# Patient Record
Sex: FEMALE | Race: WHITE
[De-identification: names, ages, dates, MRNs, and addresses within clinical notes are randomized per-mention and may not be internally consistent; named-entity substitution may affect disease eponyms.]

---

## 2021-07-15 ENCOUNTER — HOSPITAL ENCOUNTER (OUTPATIENT)
Dept: HOSPITAL 46 - DS | Age: 76
Discharge: HOME | End: 2021-07-15
Attending: SURGERY
Payer: MEDICARE

## 2021-07-15 VITALS — HEIGHT: 64 IN | BODY MASS INDEX: 29.59 KG/M2 | WEIGHT: 173.35 LBS

## 2021-07-15 DIAGNOSIS — J44.9: ICD-10-CM

## 2021-07-15 DIAGNOSIS — Z90.49: ICD-10-CM

## 2021-07-15 DIAGNOSIS — C50.112: Primary | ICD-10-CM

## 2021-07-15 DIAGNOSIS — E03.9: ICD-10-CM

## 2021-07-15 DIAGNOSIS — Z87.891: ICD-10-CM

## 2021-07-15 DIAGNOSIS — C77.3: ICD-10-CM

## 2021-07-15 DIAGNOSIS — Z79.84: ICD-10-CM

## 2021-07-15 DIAGNOSIS — E11.9: ICD-10-CM

## 2021-07-15 DIAGNOSIS — M19.90: ICD-10-CM

## 2021-07-15 PROCEDURE — 07B60ZX EXCISION OF LEFT AXILLARY LYMPHATIC, OPEN APPROACH, DIAGNOSTIC: ICD-10-PCS | Performed by: SURGERY

## 2021-07-15 PROCEDURE — A9541 TC99M SULFUR COLLOID: HCPCS

## 2021-07-15 PROCEDURE — 0HBU0ZZ EXCISION OF LEFT BREAST, OPEN APPROACH: ICD-10-PCS | Performed by: SURGERY

## 2021-07-15 NOTE — NUR
PT READY FOR DISCHARGE. PT UP WITH STAND BY ASSIST FROM INEZ ZACARIAS WHO TAKES
PT TO RESTROM. INEZ STATES PT "VOIDED A LARGE AMOUNT." VOID UNMEAUSRED.
THIS RN TO ROOM, VITAL SIGNS STABLE. DRESSING SHOWS SCANT PIN POINT SHADOWING,
OTHERWISE C/D/I. PT DENIES PAIN AND NAUSEA. PT DRESSES WITH 1 PERSON
ASSISTANCE FROM HER DAUGHTER. DISCHARGE INSTRUCTIONS REVIEWED WITH PT AND PTS
DAUGHTER. PT AND DAUGHTER VERBALIZE UNDERSTANDING OF INSTRUCTIONS,
MEDICATIONS, ACTIVITY RESTRICTIONS, AND FOLLOW UP APPOINTMENT. PT TRANSFERS
SELF TO WHEELCHAIR, NO ASSISTANCE NEEDED. PT WHEELED FROM DAY SURGERY TO MEET
SISTER AND DAUGHTER, NO ADDITIONAL REQUSTS OR CONERNS.

## 2021-07-15 NOTE — NUR
REPORT RECEIVED FROM RELL CEDILLO. THIS RN ASSUMING CARE OF PT. THIS RN TO ROOM TO
CHECK ON PT. PT RESTING IN BED WITH EYES CLOSED. PT AWAKENS TO VOICE AND
MOVEMENT IN THE ROOM. PT DENIES PAIN AND NAUSEA. PT ALSO CONTINUES TO DENY
NEED TO VOID. PT WEANED TO ROOM AIR. O2 SATURATIONS MAINTAINING ABOVE 94% ON
ROOM AIR. PT REPORTS "I THINK I'LL GET UP IN JUST A LITTLE BIT." PT DENIES
ADDITIONAL REQUESTS OR COMPLAINTS AT THIS TIME. CALL LIGHT WITHIN REACH. BED
RAILS UP. FAMILY AT BEDSIDE.

## 2021-07-15 NOTE — NUR
07/15/21 1400 Olivia Rees
9501-PATIENT ARRIVED TO PACU ON 6L MASK RR EVEN NONAROUSABLE. DRESSING
BENEATH LEFT BREAST AND UNDER LEFT ARMPIT CDI. IVF INFUSING. SR.

## 2021-07-19 NOTE — OR
[facility]
                                    [address], [place]  [address]
_________________________________________________________________________________________
                                                                 Signed   
 
 
DATE OF OPERATION:
07/15/2021
 
SURGEON:
Carlos Tai MD
 
PREOPERATIVE DIAGNOSIS:
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
 
POSTOPERATIVE DIAGNOSES:
1. 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
2. [place] lymph nodes 2/3 positive for metastatic disease, additional non-sentinel
lymph node negative for metastatic disease. 
 
PROCEDURES:
1. Injection of Methylene blue for sentinel lymph node identification.
2. Left deep axillary left node biopsy x3 (3 sentinel and additional non-sentinel).
3. Left partial mastectomy with additional deep margin excision including minimal
portion of pectoralis fascia and muscle. 
 
ANESTHESIA:
General, Dustin Pickens CRNA.
 
INDICTIONS:
This 76-year-old white woman is a patient of Dr. Silva Miller, radiologist in
[place], and formerly Dr. Lg Grider, in [place], [place].  She is noted to have
a palpable mass in the inferior aspect of the right breast in the breast crease, which
is quite obviously palpable.  The patient had undergone a mammogram 2 years ago in
[place], [place], was noted to have a mass in the inferior aspect, was recommended to
have additional images and palpable biopsy, but the patient declined.  The patient saw
Dr. Metz in recent times and the report was reviewed and again she was recommended to
undergo additional mammography and evaluation; a palpable mass was noted too.  Mass was
located in the central inferior aspect in the breast crease.  Ultrasound was performed
at the 6 o'clock position of the mass was found to be hypoechoic and lobulated,
measuring at least 39 mm in maximum dimension; it had internal blood flow consistent
with the neoplasm.  A mass had been palpated at the 12 o'clock position and 2 o'clock
position, either which showed any abnormality.  My clinical exam shows only the dominant
mass in the central inferior aspect in the breast crease itself.  A biopsy was performed
on June 24, 2020, by ultrasound guidance confirming infiltrating ductal carcinoma grade
1/3, without associated in situ component.  The patient was offered breast conserving
therapy to include lumpectomy, central lymph node biopsy, possible axillary dissection,
as well as radiation therapy or mastectomy with central lymph node biopsy and possible
 
    Electronically Signed By: CARLOS TAI MD  07/19/21 1829
_________________________________________________________________________________________
PATIENT NAME:     DARVIN DANIEL                 
MEDICAL RECORD #: [id number]            OPERATIVE REPORT              
          ACCT #: [id number]  
DATE OF BIRTH:   [birth year]            REPORT #: [id number]      
PHYSICIAN:        CARLOS TAI MD                 
PCP:              LG GRIDER MD         
REPORT IS CONFIDENTIAL AND NOT TO BE RELEASED WITHOUT AUTHORIZATION
 
 
                                  [facility]
                                    [address], [place]  [address]
_________________________________________________________________________________________
                                                                 Signed   
 
 
axillary dissection.  She prefers to have lumpectomy if possible.  The risks of bleeding
and infection,  cosmetic deformity and need for additional treatment, and need for
axillary dissection were reviewed with her.  Current guidelines according to
[facility]ABP-; if more than 2 sentinel lymph nodes are positive, then axillary dissection
was certainly be undertaken; and if 2 or less then it would not be undertaken at this
time.  She understands this variations of treatment.  She also understands a
chemotherapy will possibly be recommended if lymph node positive and definitely will be
recommended for radiation therapy.  Understand all of that and she wishes to proceed. 
 
FINDINGS:
Good uptake Methylene blue dye to sentinel lymph nodes was noted.  Additionally,
radionuclide was taken up as well.  There were 3 obvious sentinel lymph nodes identified
and one additional non-sentinel lymph node also identified and excised.  Frozen
pathology showed 2/3 sentinel lymph nodes to have metastatic disease and the
non-sentinel node was negative as was the third sentinel lymph node.  Remaining axilla
was negative and had actually no uptake of radionuclide or methylene blue dye.
Completion of axillary dissection was not undertaken. 
 
As regard to tumor itself that was firm and quite obvious, but located well in relation
to the breast in the central inferior aspect in the breast crease.  Wide excision was
skin and breast parenchyma was undertaken, the lesion did not clinically appeared to
penetrate through the pectoralis fascia and to the muscle; however, an additional deep
margin was taken given in the location of the tumor and to assure that there is negative
margin. 
 
DESCRIPTION OF PROCEDURE:
The patient was brought to the operating room, given a general anesthetic.  Preoperative
antibiotic Ancef was given, sequential compression stockings used, and heparin
subcutaneously administered and Radiology sweep for radionuclide injection for sentinel
lymph node identification. 
 
A 2 mL of Methylene blue dye was injected in the central inferior subepithelial area
near the areolar margin for additional localization of sentinel lymph nodes.  The arm
was extended and the left breast, upper axilla and arm, and chest wall prepared with a
Betadine bath solution.  Using a C-Trak gamma probe device, the axilla was interrogated
and did have an area of increased uptake.  A small transverse incision in the axilla was
made and the dissection was carried through the subcutaneous tissue.  Multiple
lymphatics were identified by Methylene blue dye and further dissection identified a
moderate sized lymph node and not far from it and noted the sentinel lymph node both
with uptake of radionuclide and dye.  This was dissected free and passed the central
lymph nodes #1 and #2.  An additional non-sentinel node was noted with a specimen as
well.  A general dissection more thoroughly undertaken showed additional lymphatic
 
    Electronically Signed By: CARLOS TAI MD  07/19/21 2437
_________________________________________________________________________________________
PATIENT NAME:     DARVIN DANIEL                 
MEDICAL RECORD #: [id number]            OPERATIVE REPORT              
          ACCT #: [id number]  
DATE OF BIRTH:   [birth year]            REPORT #: [id number]      
PHYSICIAN:        CARLOS TAI MD                 
PCP:              LG GRIDER MD         
REPORT IS CONFIDENTIAL AND NOT TO BE RELEASED WITHOUT AUTHORIZATION
 
 
                                  [facility]
                                    [address], [place]  [address]
_________________________________________________________________________________________
                                                                 Signed   
 
 
channels with Methylene blue dye and dissection was carried along the path of these
lymphatic channels identifying another blue sentinel lymph node, and it was identified
as sentinel lymph node #3.  These were sent for frozen pathology.  Additional palpation
and interrogation with a gamma probe showed no other candidates considered sentinel
lymph nodes.  Wound was then packed with some gauze and attention towards the primary
tumor. 
 
Tumor itself was in the central inferior aspect; it was not fixed to the chest wall.  An
elliptical incision was made incorporating general segment of skin directly over the
mass and located also in the inframammary crease.  Dissection to the dermis was
undertaken with electrocautery.  Flaps were developed inferiorly and superiorly,
maintaining the clinically negative margin between the tumor and the breast parenchyma.
The deepest portion of resection was undertaken to provide any clinically negative
margin.  Specimen was oriented with a short stitch superiorly and long stitch laterally.
 Examination of the operative site showed no particularly suspicious remaining areas.
Now at the last given location of the tumor, which was rather deep in relation to the
breast parenchyma and close in relation to the pectoralis fascia, resection of the base
of the wound was undertaken, which include portions of the pectoralis muscle in minimal
amount as well as the margin of the tumor.  This was oriented similarly as a superior
stitch, that was showed in a lateral stitch that was long.  Irrigation was undertaken in
sterile water.  There was no findings of concern.  Hemostasis was assured with
electrocautery.  The parenchyma of the breast was reapproximated with interrupted 2-0
Vicryl in layers.  At this point, the frozen pathology report was returned by Dr. Connors
and this showed sentinel lymph node #1 to have metastatic disease, considered
"microscopic" thin lymph node and sentinel lymph node #2 similarly positive.  The 3rd
sentinel lymph node was considered negative.  Mindful, there was additional non-sentinel
lymph node submitted, it was additionally evaluated by frozen pathology showing no sign
of metastatic disease.  Mindful, current guidelines guiding the positivity of the
sentinel lymph nodes as more than 2 positive supporting the concept of completion
axillary dissection, she was considered reasonable to avoid axillary dissection under
this circumstances.  Irrigation was undertaken the axilla was sterile water, was no
evidence of other palpable abnormalities and re-interrogation with the C-Trak probe,
showed no additional candidate that might be submitted as a sentinel node or even any
other node particularly.  There are no clinical suspicious nodes either.  The wound was
then closed with interrupted 2-0 Vicryl in deep layer and running subcuticular 3-0
Vicryl for the skin.  Steri-Strips were applied to the both sides as were Acticoat
dressings.  She was ultimately extubated and taken to recovery room in good condition
with no complication.  Sponge, needle, and instruments counts were reported as correct
x3. 
 
 
 
 
    Electronically Signed By: CARLOS TAI MD  07/19/21 8589
_________________________________________________________________________________________
PATIENT NAME:     DARVIN DANIEL                 
MEDICAL RECORD #: [id number]            OPERATIVE REPORT              
          ACCT #: [id number]  
DATE OF BIRTH:   [birth year]            REPORT #: [id number]      
PHYSICIAN:        CARLOS TAI MD                 
PCP:              LG GRIDER MD         
REPORT IS CONFIDENTIAL AND NOT TO BE RELEASED WITHOUT AUTHORIZATION
 
 
                                  [address]  [address]
_________________________________________________________________________________________
                                                                 Signed   
 
 
            ________________________________________
            MD CONSUELO Mckeon/YUNIER
Job #:  [id number]
DD:  07/15/2021 14:17:13
DT:  07/15/2021 22:12:36
 
cc:            MD Fred Henry, MD Silva Miller, MD Davi Saunders MD, PH.D.
 
 
Copies:  LG GRIDER MD,FRED MILLER,DAVI JERNIGAN MD
 
 
 
 
 
 
 
 
 
 
 
 
 
 
 
 
 
 
 
 
    Electronically Signed By: CARLOS TAI MD  07/19/21 1829
_________________________________________________________________________________________
PATIENT NAME:     DARVIN DANIEL                 
MEDICAL RECORD #: [id number]            OPERATIVE REPORT              
          ACCT #: [id number]  
DATE OF BIRTH:   [birth year]            REPORT #: [id number]      
PHYSICIAN:        CARLOS TAI MD                 
PCP:              LG GRIDER MD         
REPORT IS CONFIDENTIAL AND NOT TO BE RELEASED WITHOUT AUTHORIZATION

## 2021-08-24 ENCOUNTER — HOSPITAL ENCOUNTER (OUTPATIENT)
Dept: HOSPITAL 46 - DS | Age: 76
Discharge: HOME | End: 2021-08-24
Attending: SURGERY
Payer: MEDICARE

## 2021-08-24 VITALS — WEIGHT: 175.27 LBS | HEIGHT: 64 IN | BODY MASS INDEX: 29.92 KG/M2

## 2021-08-24 DIAGNOSIS — C50.912: Primary | ICD-10-CM

## 2021-08-24 DIAGNOSIS — Z86.73: ICD-10-CM

## 2021-08-24 DIAGNOSIS — Z90.49: ICD-10-CM

## 2021-08-24 DIAGNOSIS — N64.1: ICD-10-CM

## 2021-08-24 DIAGNOSIS — E11.9: ICD-10-CM

## 2021-08-24 DIAGNOSIS — Z79.84: ICD-10-CM

## 2021-08-24 DIAGNOSIS — C77.3: ICD-10-CM

## 2021-08-24 DIAGNOSIS — J45.909: ICD-10-CM

## 2021-08-24 PROCEDURE — 07B60ZX EXCISION OF LEFT AXILLARY LYMPHATIC, OPEN APPROACH, DIAGNOSTIC: ICD-10-PCS | Performed by: SURGERY

## 2021-08-24 NOTE — NUR
[id number]: PT RESTING IN BED AWAKE CONVERSING WITH FAMILY AT BEDSIDE. PT
TOLERATES JELLO AND WATER WITH NO NAUSEA. PT CONT TO DENY PAIN IN LEFT AXILLA.
PROVIDED COFFEE PER REQUEST. PT ASKS WHEN SHE CAN DC HOME AND ENCOURAGED TO
VOID PRIOR TO DC. CALL LIGHT WITHIN REACH, PT ENCOURAGED TO USE WITH URGE TO
VOID.

## 2021-08-24 NOTE — NUR
PT ARRIVES BACK TO DS RM 4 FROM PACU DROWSY. PT AROUSES TO VERBAL STIMULATION
AND ANSWERS COMMANDS APPROPRIATELY, BUT EASILY DOZES OFF WITH NO STIMULTION.
PT STATES, "BOY I AM SLEEPY." PT DAUGHTER AT BEDSIDE. DC CRITERIA EXPLAINED TO
PT, CALL LIGHT WITHIN REACH.

## 2021-08-24 NOTE — NUR
[id number]: PT USES CALL LIGHT TO NOTIFY THIS RN OF URGE TO VOID. MISHA DRAIN SAFETY
PINNED TO GOWN, PT SITS AT SIDE OF BED DENIES ANY DIZZINESS OR NAUSEA WITH
POSITION CHANGE. PT AMBULATES WITH STEADY GAIT TO BATHROOM, ABLE TO VOID
APPROX 100 MLS YELLOW URINE WITH NO PROBLEMS. PT BACK TO DS RM 4 TO GET
CHANGED, DAUGHTER IN ROOM AT BEDSIDE TO ASSIST.
1220: DC INSTRUCTIONS GIVEN VERBALLY AND WRITTEN. PT AND PT DAUGHTER EDUCATED
ABOUT MISHA DRAIN AND GIVEN DEMONSTRATION OF HOW TO DRAIN AND PROPERLY CARE FOR
WITH EDUCATION AND FLOWSHEET. PAIN MEDICATION PRESCRIPTION GIVEN TO PT,
ENCOURAGED TO TAKE TO PHARMACY TO FILL. PT DC FROM DS RM 4 VIA WC TO FAMILY
WAITING IN PERSONAL VEHICLE AT MAIN ENTRANCE OF HOSPITAL TO HOME.

## 2021-08-24 NOTE — NUR
08/24/21 0912 Laura Damon 0858 PT ARRIVED IN PACU NON RESPONSIVE TO NOXIOUS STIMULI WITH OPA
IN PLACE. BLOOD SUGAR 199. 0910 NO CHANGE IN STATUS.

## 2021-08-25 NOTE — OR
[facility]
                                    [address]
                                  [place], [place]  [address]
_________________________________________________________________________________________
                                                                 Signed   
 
 
DATE OF OPERATION:
08/24/2021
 
SURGEON:
Carlos Tai MD
 
PREOPERATIVE DIAGNOSES:
1. Left infiltrating ductal breast carcinoma, status post partial mastectomy with
sentinel lymph node biopsy x3. 
2. Metastatic disease to the sentinel lymph nodes x3.
 
POSTOPERATIVE DIAGNOSES:
1. Left infiltrating ductal breast carcinoma, status post partial mastectomy with
sentinel lymph node biopsy x3. 
2. Metastatic disease to the sentinel lymph nodes x3.
 
PROCEDURE:
Left axillary dissection.
 
ANESTHESIA:
General endotracheal; Giles Marino CRNA.
 
INDICATIONS:
This 76-year-old white woman is a patient of Dr. Torey Metz and underwent left
partial mastectomy with sentinel lymph node biopsy x3 on July 15, 2021.  At operation,
frozen pathology showed the first two sentinel lymph nodes to be positive for metastatic
disease, the 3rd one considered negative.  Upon final review, however, the sentinel
lymph node #3 was also positive.  On the basis of current guidelines, completion of
axillary dissection is recommended.  As regards to the primary tumor, it was 28 mm in
size and considered grade 2/3.  There was no DCIS component.  Excision margins were
negative.  She is admitted at this time to undergo completion of left axillary
dissection.  She understands the risks of bleeding, infection, nerve injury, and other
unforeseen complications. 
 
FINDINGS:
Scar tissue from prior dissection was noted.  Dissection was undertaken with axillary
tissue excised inferior to the left subclavian rather the left axillary vein lateral to
the long thoracic nerve and superficial to the thoracodorsal neurovascular bundle.
Generous fatty tissue presumably with lymph nodes was noted.  There was no sign of
obvious positive lymph nodes, though final pathology is pending of course.  Good
function of the nerves in question was demonstrated. 
 
 
    Electronically Signed By: CARLOS TAI MD  08/25/21 1317
_________________________________________________________________________________________
PATIENT NAME:     DARVIN DANIEL                 
MEDICAL RECORD #: [id number]            OPERATIVE REPORT              
          ACCT #: [id number]  
DATE OF BIRTH:   [birth year]            REPORT #: [id number]      
PHYSICIAN:        CARLOS TAI MD                 
PCP:              JERARDO GRIDER MD         
REPORT IS CONFIDENTIAL AND NOT TO BE RELEASED WITHOUT AUTHORIZATION
 
 
                                  [facility]
                                    [address]
                                  [place]  [address]
_________________________________________________________________________________________
                                                                 Signed   
 
 
DESCRIPTION OF PROCEDURE:
The patient was brought to the operating room, given a general endotracheal anesthetic.
Preoperative antibiotic Ancef was given.  Sequential compression device stockings were
used and heparin subcutaneously administered.  After satisfactory general endotracheal
anesthesia, the left breast and axilla were prepared with a chlorhexidine solution and
draped sterilely.  The previous small transverse incision in the axilla was incised
medially and laterally and dissection carried through the subcutaneous tissue with blunt
and electrocautery dissection.  Using sharp dissection, the axillary fat pad was
dissected free identifying ultimately the axillary vein.  Dissection inferior to this
and medial allowed for identification ultimately, the long thoracic and thoracodorsal
neurovascular bundles and the axillary fat and attendant lymph nodes were incorporated
in the resected specimen.  Clips were used for hemostasis throughout.  The long thoracic
and thoracodorsal neurovascular bundles were intact and unharmed.  The specimen was
passed for pathology.  Irrigation was undertaken.  The axilla was sterile water for its
tumor and lytic effect.  Additional clips and cautery used in areas suspect for possible
future bleeding.  Through a separate stab incision, a 7 mm flat Rodolfo drain was placed
and secured to the skin with a nylon suture.  The deep axillary soft tissue was
reapproximated with interrupted 2-0 Vicryl and skin closed with a running subcuticular
3-0 Vicryl, Steri-Strips were applied as was an Acticoat silver sponge dressing.  The
patient tolerated procedure well. 
 
BLOOD LOSS:
Less than 20 mL in aggregate.
 
 
 
            ________________________________________
            Carlos Tai MD 
 
 
/MONIQUEL
Job #:  [id number]
DD:  08/24/2021 09:11:09
DT:  08/24/2021 10:07:17
 
cc:            MD Tl Mcclain MD, PH.D. 
 
               Yassine Blake MD
 
 
 
    Electronically Signed By: CARLOS TAI MD  08/25/21 1317
_________________________________________________________________________________________
PATIENT NAME:     DARVIN DANIEL                 
MEDICAL RECORD #: [id number]            OPERATIVE REPORT              
          ACCT #: [id number]  
DATE OF BIRTH:   [birth year]            REPORT #: [id number]      
PHYSICIAN:        CARLOS TAI MD                 
PCP:              JERARDO GRIDER MD         
REPORT IS CONFIDENTIAL AND NOT TO BE RELEASED WITHOUT AUTHORIZATION
 
 
                                  [address]  [address]
_________________________________________________________________________________________
                                                                 Signed   
 
 
Copies:  TOREY METZ MD, JUNO QUACKENBUSH, ROBERT C MD
~
 
 
 
 
 
 
 
 
 
 
 
 
 
 
 
 
 
 
 
 
 
 
 
 
 
 
 
 
 
 
 
 
 
 
 
 
 
 
 
    Electronically Signed By: CARLOS TAI MD  08/25/21 1317
_________________________________________________________________________________________
PATIENT NAME:     FREDYDARVINSANJAY BAGLEY                 
MEDICAL RECORD #: [id number]            OPERATIVE REPORT              
          ACCT #: [id number]  
DATE OF BIRTH:   [birth year]            REPORT #: [id number]      
PHYSICIAN:        CARLOS TAI MD                 
PCP:              JERARDO GRIDER MD         
REPORT IS CONFIDENTIAL AND NOT TO BE RELEASED WITHOUT AUTHORIZATION

## 2021-08-26 NOTE — PATH
[facility]
                                    [address], [place]  [address]
_________________________________________________________________________________________
                                                                 Signed   
 
 
 
SPECIMEN(S): A LEFT AXILLARY CONTENTS
 
SPECIMEN SOURCE:
A. LEFT AXILLARY CONTENTS
 
CLINICAL HISTORY:
Infiltrating ductal carcinoma of left breast
 
FINAL PATHOLOGIC DIAGNOSIS:
Left axillary contents, dissection:
-  No evidence of malignancy in ten lymph nodes (0/10).
-  Fibroadipose tissue with fat necrosis.
NAL:cml:C2NR
 
MICROSCOPIC EXAMINATION:
Histologic sections of all submitted blocks are examined by light microscopy.  
These findings, together with the gross examination, support the pathologic 
diagnosis. 
 
GROSS DESCRIPTION:
The specimen, labeled "BA," and designated on the requisition "left axillary 
contents," is received in formalin and consists of one piece of, unoriented, 
ragged, yellow, fibrofatty, 11.7 x 0.2 x 3.4 
cm tissue containing 11 pink-tan to tan-white somewhat chalky, lymph node 
candidates from 0.3 up to 2.6 cm in greatest dimension. 
The lymph node candidate is submitted entirely as follows:
A1   5 lymph node candidates in toto
A2   2 lymph node candidates in toto
A3   one bisected lymph node candidate
A4   one bisected lymph node candidate
A5-A8     one cross-sectioned lymph node candidate
A9-A12    one cross-sectioned lymph node candidate
AI (under the direct supervision of a pathologist)
The Gross Description was prepared using a voice recognition system. The report 
was reviewed for accuracy; however, sound-alike word errors, addition and/or 
deletions may occur. If there is any 
question about this report, please contact Client Services.
 
PERFORMING LABORATORY:
The technical component was performed by [facility], [address] [place] [id number] (Medical Director: Rachel Varghese MD; CLIA# [id number]). 
 
                                                                                    
_________________________________________________________________________________________
PATIENT NAME:     DARVIN DANIEL                 
MEDICAL RECORD #: [id number]            PATHOLOGY                     
          ACCT #: [id number]       ACCESSION #: [id number]     
DATE OF BIRTH:   [birth year]            REPORT #: [id number]       
PHYSICIAN:        [facility]              
PCP:              JERARDO GRIDER MD         
REPORT IS CONFIDENTIAL AND NOT TO BE RELEASED WITHOUT AUTHORIZATION
 
 
                                  [facility]
                                    [address], [place]  [address]
_________________________________________________________________________________________
                                                                 Signed   
 
 
Professional interpretation was performed by 
[facility], [address] [address] (CLIA# [id number]). 
 
Diagnostician:  Rosaline Connors MD
Pathologist
Electronically Signed 08/26/2021
 
 
Copies:                                
~
 
 
 
 
 
 
 
 
 
 
 
 
 
 
 
 
 
 
 
 
 
 
 
 
 
 
 
 
 
 
 
 
                                                                                    
_________________________________________________________________________________________
PATIENT NAME:     DARVIN DANIEL                 
MEDICAL RECORD #: [id number]            PATHOLOGY                     
          ACCT #: [id number]       ACCESSION #: [id number]     
DATE OF BIRTH:   [birth year]            REPORT #: [id number]       
PHYSICIAN:        [facility]              
PCP:              JERARDO GRIDER MD         
REPORT IS CONFIDENTIAL AND NOT TO BE RELEASED WITHOUT AUTHORIZATION

## 2022-12-13 ENCOUNTER — HOSPITAL ENCOUNTER (EMERGENCY)
Dept: HOSPITAL 46 - ED | Age: 77
Discharge: HOME | End: 2022-12-13
Payer: MEDICARE

## 2022-12-13 VITALS — WEIGHT: 172 LBS | HEIGHT: 65 IN | BODY MASS INDEX: 28.66 KG/M2

## 2022-12-13 DIAGNOSIS — N39.0: ICD-10-CM

## 2022-12-13 DIAGNOSIS — Z20.822: ICD-10-CM

## 2022-12-13 DIAGNOSIS — E78.5: ICD-10-CM

## 2022-12-13 DIAGNOSIS — E11.9: ICD-10-CM

## 2022-12-13 DIAGNOSIS — Z79.84: ICD-10-CM

## 2022-12-13 DIAGNOSIS — Z79.899: ICD-10-CM

## 2022-12-13 DIAGNOSIS — J10.1: Primary | ICD-10-CM

## 2022-12-13 PROCEDURE — U0003 INFECTIOUS AGENT DETECTION BY NUCLEIC ACID (DNA OR RNA); SEVERE ACUTE RESPIRATORY SYNDROME CORONAVIRUS 2 (SARS-COV-2) (CORONAVIRUS DISEASE [COVID-19]), AMPLIFIED PROBE TECHNIQUE, MAKING USE OF HIGH THROUGHPUT TECHNOLOGIES AS DESCRIBED BY CMS-2020-01-R: HCPCS

## 2022-12-13 PROCEDURE — G0480 DRUG TEST DEF 1-7 CLASSES: HCPCS

## 2022-12-13 PROCEDURE — C9803 HOPD COVID-19 SPEC COLLECT: HCPCS

## 2022-12-13 NOTE — EKG
[facility]
                                    [address] [place]  [address]
_________________________________________________________________________________________
                                                                 Signed   
 
 
Sinus tachycardia with premature ventricular complexes or fusion complexes
Otherwise normal ECG
When compared with ECG of 12-JUL-2021 09:29,
fusion complexes are now present
premature ventricular complexes are now present
Confirmed by QIAN RUTLEDGE MD ([id number]) on 12/13/2022 10:42:49 PM
 
 
 
 
 
 
 
 
 
 
 
 
 
 
 
 
 
 
 
 
 
 
 
 
 
 
 
 
 
 
 
 
 
 
 
 
 
 
 
    Electronically Signed By: QIAN RUTLEDGE MD  12/13/22 2242
_________________________________________________________________________________________
PATIENT NAME:     DARVIN DANIEL                 
MEDICAL RECORD #: [id number]                     Electrocardiogram             
          ACCT #: [id number]  
DATE OF BIRTH:   [birth year]                                       
PHYSICIAN:   QIAN RUTLEDGE MD                   REPORT #: [id number]
REPORT IS CONFIDENTIAL AND NOT TO BE RELEASED WITHOUT AUTHORIZATION